# Patient Record
Sex: MALE | Race: AMERICAN INDIAN OR ALASKA NATIVE
[De-identification: names, ages, dates, MRNs, and addresses within clinical notes are randomized per-mention and may not be internally consistent; named-entity substitution may affect disease eponyms.]

---

## 2018-08-11 ENCOUNTER — HOSPITAL ENCOUNTER (EMERGENCY)
Dept: HOSPITAL 31 - C.ER | Age: 43
Discharge: HOME | End: 2018-08-11
Payer: COMMERCIAL

## 2018-08-11 VITALS
OXYGEN SATURATION: 97 % | SYSTOLIC BLOOD PRESSURE: 123 MMHG | DIASTOLIC BLOOD PRESSURE: 82 MMHG | RESPIRATION RATE: 16 BRPM | HEART RATE: 88 BPM

## 2018-08-11 VITALS — TEMPERATURE: 97.7 F

## 2018-08-11 DIAGNOSIS — I10: ICD-10-CM

## 2018-08-11 DIAGNOSIS — Z86.718: ICD-10-CM

## 2018-08-11 DIAGNOSIS — M79.604: Primary | ICD-10-CM

## 2018-08-11 DIAGNOSIS — Z21: ICD-10-CM

## 2018-08-11 PROCEDURE — 96372 THER/PROPH/DIAG INJ SC/IM: CPT

## 2018-08-11 PROCEDURE — 99284 EMERGENCY DEPT VISIT MOD MDM: CPT

## 2018-08-11 NOTE — C.PDOC
History Of Present Illness


Pt c/o right leg pain. Denies recent trauma. h/o DVT. 


Time Seen by Provider: 08/11/18 18:46


Chief Complaint (Nursing): Lower Extremity Problem/Injury


History Per: Patient


Onset/Duration Of Symptoms: Days (few)


Current Symptoms Are (Timing): Still Present


Severity: Moderate


Additional History Per: Prior Records





Past Medical History


Reviewed: Historical Data, Nursing Documentation, Vital Signs


Vital Signs: 





 Last Vital Signs











Temp  97.7 F   08/11/18 18:15


 


Pulse  76   08/11/18 18:15


 


Resp  20   08/11/18 18:15


 


BP  120/76   08/11/18 18:15


 


Pulse Ox  98   08/11/18 18:15














- Medical History


PMH: Deep Vein Thrombosis, HIV, HTN


Family History: States: Unknown Family Hx





- Social History


Hx Tobacco Use: No


Hx Alcohol Use: No


Hx Substance Use: No





- Immunization History


Hx Tetanus Toxoid Vaccination: No


Hx Influenza Vaccination: No


Hx Pneumococcal Vaccination: No





Review Of Systems


Except As Marked, All Systems Reviewed And Found Negative.


Constitutional: Negative for: Fever, Weakness


Cardiovascular: Negative for: Chest Pain


Respiratory: Negative for: Shortness of Breath, Hemoptysis


Gastrointestinal: Negative for: Vomiting, Abdominal Pain


Musculoskeletal: Positive for: Leg Pain (right).  Negative for: Back Pain


Neurological: Negative for: Weakness, Numbness





Physical Exam





- Physical Exam


Appears: Non-toxic, No Acute Distress


Skin: Warm, Dry


Head: Atraumatic, Normacephalic


Eye(s): bilateral: PERRL, EOMI


Neck: Normal ROM, Supple


Cardiovascular: Rhythm Regular


Respiratory: Normal Breath Sounds, No Accessory Muscle Use


Gastrointestinal/Abdominal: Soft, No Tenderness


Back: No CVA Tenderness


Extremity: Normal ROM, Tenderness (right lower leg), No Calf Tenderness, 

Capillary Refill (wnl), Other (right lower leg venous stasis changes)


Pulses: Right Dorsalis Pedis: Normal


Neurological/Psych: Oriented x3, Normal Motor, Normal Sensation





ED Course And Treatment


O2 Sat by Pulse Oximetry: 98


Pulse Ox Interpretation: Normal


Progress Note: I want to order a RLE dulplex US to r/o acute DVT, however it is 

not available at this time. Will anticoagulate pt with Lovenox and instructed 

to return in the morning for the test.





Disposition


Counseled Patient/Family Regarding: Diagnosis, Need For Followup





- Disposition


Disposition: HOME/ ROUTINE


Disposition Time: 19:10


Condition: STABLE


Additional Instructions: 


Return to the ER in the morning for an ultrasound of your leg to check for a 

blood clot. Return to the ER immediately if you develop chest pain, shortness 

of breath, worsening of symptoms or if you have any other concerns.


Forms:  General Discharge Instructions





- Clinical Impression


Clinical Impression: 


 Right leg pain

## 2018-08-12 ENCOUNTER — HOSPITAL ENCOUNTER (EMERGENCY)
Dept: HOSPITAL 31 - C.ER | Age: 43
Discharge: HOME | End: 2018-08-12
Payer: COMMERCIAL

## 2018-08-12 VITALS — HEART RATE: 82 BPM | DIASTOLIC BLOOD PRESSURE: 72 MMHG | TEMPERATURE: 98.6 F | SYSTOLIC BLOOD PRESSURE: 118 MMHG

## 2018-08-12 VITALS — OXYGEN SATURATION: 100 % | RESPIRATION RATE: 18 BRPM

## 2018-08-12 DIAGNOSIS — I10: ICD-10-CM

## 2018-08-12 DIAGNOSIS — I82.511: Primary | ICD-10-CM

## 2018-08-12 DIAGNOSIS — I80.9: ICD-10-CM

## 2018-08-12 NOTE — C.PDOC
History Of Present Illness


42-year-old male, presents to the emergency department with complaints of pain 

in right calf, developed three days ago. Patient came to ED for evaluation, he 

was given a shot of Lovenox and discharged, instructed to return for ultrasound 

today. Patient notes a Hx of DVT in 2005. He denies any chest pain, shortness 

of breath, nausea/vomiting, or any other associated symptoms. No other 

complaints at this time.


Time Seen by Provider: 08/12/18 10:17


Chief Complaint (Nursing): Lower Extremity Problem/Injury


History Per: Patient


History/Exam Limitations: no limitations


Onset/Duration Of Symptoms: Days (3)


Current Symptoms Are (Timing): Still Present


Severity: Moderate





Past Medical History


Reviewed: Historical Data, Nursing Documentation, Vital Signs


Vital Signs: 


 Last Vital Signs











Temp  98.6 F   08/12/18 12:15


 


Pulse  82   08/12/18 12:15


 


Resp  18   08/12/18 12:15


 


BP  118/72   08/12/18 12:15


 


Pulse Ox  100   08/12/18 12:15














- Medical History


PMH: Deep Vein Thrombosis, HIV, HTN


Family History: States: No Known Family Hx





- Social History


Hx Tobacco Use: No


Hx Alcohol Use: No


Hx Substance Use: No





- Immunization History


Hx Tetanus Toxoid Vaccination: No


Hx Influenza Vaccination: No


Hx Pneumococcal Vaccination: No





Review Of Systems


Constitutional: Negative for: Fever


Cardiovascular: Negative for: Chest Pain, Palpitations


Respiratory: Negative for: Shortness of Breath


Musculoskeletal: Positive for: Leg Pain (Right)


Neurological: Negative for: Weakness, Numbness





Physical Exam





- Physical Exam


Appears: Non-toxic, No Acute Distress


Skin: Normal Color, Warm, Dry, No Rash


Head: Atraumatic, Normacephalic


Eye(s): bilateral: Normal Inspection


Nose: Normal


Oral Mucosa: Moist


Lips: Normal Appearing


Neck: Normal ROM


Cardiovascular: Rhythm Regular, No Murmur


Respiratory: Normal Breath Sounds, No Accessory Muscle Use


Extremity: Tenderness (R lower leg), No Pedal Edema, No Calf Tenderness, 

Capillary Refill (<2 seconds), No Deformity, No Swelling, Other (right lower 

leg venous stasis changes)


Pulses: Left Dorsalis Pedis: Normal, Right Dorsalis Pedis: Normal


Neurological/Psych: Oriented x3, Normal Speech





ED Course And Treatment


O2 Sat by Pulse Oximetry: 100


Pulse Ox Interpretation: Normal (RA)





Medical Decision Making


Medical Decision Making: 


Plan:


* Venous duplex scan





Doppler shows partial chronic DVT in firhg femral, popliteal and posterior 

tibail veins


Superficial phlebitis of right GSV


Discussed with Dr Schofield who agrees no further treatment in ED and patient 

stable for discharge. 


Inform patient of findings and also recommend analgesics as needed. Patient can 

follow up with PCP








Disposition


Counseled Patient/Family Regarding: Diagnosis, Need For Followup





- Disposition


Referrals: 


[facility] [Outside]


[facility] [Outside]


[facility] [Outside]


Disposition: HOME/ ROUTINE


Disposition Time: 11:57


Condition: STABLE


Additional Instructions: 


Take Advil or Aleve for any pain


Follow up with your physician


Instructions:  Phlebitis (DC)


Forms:  [facility] (English)





- POA


Present On Arrival: None





- Clinical Impression


Clinical Impression: 


 Phlebitis, Chronic deep vein thrombosis (DVT)








- Scribe Statement


The provider has reviewed the documentation as recorded by the Scribe (Trip Cornejo)








All medical record entries made by the Scribe were at my direction and 

personally dictated by me. I have reviewed the chart and agree that the record 

accurately reflects my personal performance of the history, physical exam, 

medical decision making, and the department course for this patient. I have 

also personally directed, reviewed, and agree with the discharge instructions 

and disposition.

## 2018-08-12 NOTE — VASCLAB
Date of service: 



08/12/2018



PROCEDURE:  Right Lower Extremity Venous Duplex Exam. 



HISTORY:

Right leg pain 



PRIORS:

Last exam 6/15/2016, chronic DVT.



TECHNIQUE:

Right common femoral, femoral, popliteal and posterior tibial, 

peroneal and great saphenous veins were evaluated. Flow was assessed 

with color Doppler, compressibility, assessment of phasic flow and 

augmentation response.



Report prepared by   MYNOR Tesfaye



FINDINGS:



RIGHT:

1. Common Femoral Vein: 



1.1. Compressibility - Fully compressible: Thrombus -  None: Flow - 

Phasic: Augmentation -Normal: Reflux - Mild.1.99s



2. Femoral Vein: 



2.1. Compressibility - Partial: Thrombus -  Chronic: Flow - Reduced : 

Augmentation -Normal: Reflux - Severe.>3.47s



3. Popliteal Vein: 



3.1. Compressibility - Partial: Thrombus -  Chronic: Flow - Reduced : 

Augmentation -Normal: Reflux - Severe.>4.26s



4. Posterior Tibial Vein: (one of the paired veins partially 

compressed)



4.1. Compressibility - Partial: Thrombus -  Chronic: Flow - Reduced : 

Augmentation -Normal: Reflux - Severe.>4.16s



5. Peroneal Vein: 



5.1. Compressibility - Fully compressible: Thrombus - None:  Flow - 

Phasic: Augmentation -Normal: Reflux - None.



6. Great Saphenous Vein: (at distal calf)

6.1.  Compressibility - Partial: Thrombus -Chronic: Flow - Phasic: 

Augmentation - Normal: Reflux - None.





OTHER FINDINGS:  Normal venous flow noted in the LEFT common femoral 

vein.



IMPRESSION:

1. Partial chronic deep vein thrombosis of the right femoral, 

popliteal and posterior tibial veins.



2. Superficial phlebitis of the right great saphenous vein at distal 

calf level.



3. Severe valvular incompetence noted of the right lower extremity.



Findings were reported to JESUSITA Fitzpatrick at 11:46 a.m.